# Patient Record
Sex: FEMALE | URBAN - METROPOLITAN AREA
[De-identification: names, ages, dates, MRNs, and addresses within clinical notes are randomized per-mention and may not be internally consistent; named-entity substitution may affect disease eponyms.]

---

## 2017-06-13 ENCOUNTER — APPOINTMENT (RX ONLY)
Dept: URBAN - METROPOLITAN AREA CLINIC 158 | Facility: CLINIC | Age: 22
Setting detail: DERMATOLOGY
End: 2017-06-13

## 2017-06-13 DIAGNOSIS — L50.1 IDIOPATHIC URTICARIA: ICD-10-CM

## 2017-06-13 DIAGNOSIS — L50.3 DERMATOGRAPHIC URTICARIA: ICD-10-CM

## 2017-06-13 PROBLEM — F41.9 ANXIETY DISORDER, UNSPECIFIED: Status: ACTIVE | Noted: 2017-06-13

## 2017-06-13 PROBLEM — L29.8 OTHER PRURITUS: Status: ACTIVE | Noted: 2017-06-13

## 2017-06-13 PROCEDURE — 99203 OFFICE O/P NEW LOW 30 MIN: CPT

## 2017-06-13 PROCEDURE — ? TREATMENT REGIMEN

## 2017-06-13 PROCEDURE — ? COUNSELING

## 2017-06-13 PROCEDURE — ? PRESCRIPTION

## 2017-06-13 RX ORDER — LEVOCETIRIZINE DIHYDROCHLORIDE 5 MG
ONE TABLET ORAL QAM
Qty: 30 | Refills: 5 | Status: ERX | COMMUNITY
Start: 2017-06-13

## 2017-06-13 RX ORDER — FEXOFENADINE 180 MG/1
ONE TABLET, FILM COATED ORAL
Qty: 30 | Refills: 5 | Status: ERX | COMMUNITY
Start: 2017-06-13

## 2017-06-13 RX ORDER — DOXEPIN HYDROCHLORIDE 25 MG/1
ONE CAPSULE ORAL QHS
Qty: 30 | Refills: 5 | Status: ERX | COMMUNITY
Start: 2017-06-13

## 2017-06-13 RX ADMIN — DOXEPIN HYDROCHLORIDE ONE: 25 CAPSULE ORAL at 20:31

## 2017-06-13 RX ADMIN — FEXOFENADINE ONE: 180 TABLET, FILM COATED ORAL at 20:31

## 2017-06-13 RX ADMIN — Medication ONE: at 20:31

## 2017-06-13 ASSESSMENT — LOCATION SIMPLE DESCRIPTION DERM
LOCATION SIMPLE: LEFT FOREARM
LOCATION SIMPLE: RIGHT PRETIBIAL REGION
LOCATION SIMPLE: UPPER BACK
LOCATION SIMPLE: LEFT THIGH
LOCATION SIMPLE: RIGHT UPPER ARM
LOCATION SIMPLE: ABDOMEN
LOCATION SIMPLE: RIGHT HAND

## 2017-06-13 ASSESSMENT — LOCATION DETAILED DESCRIPTION DERM
LOCATION DETAILED: LEFT ANTERIOR DISTAL THIGH
LOCATION DETAILED: RIGHT RADIAL DORSAL HAND
LOCATION DETAILED: RIGHT PROXIMAL PRETIBIAL REGION
LOCATION DETAILED: RIGHT ULNAR DORSAL HAND
LOCATION DETAILED: INFERIOR THORACIC SPINE
LOCATION DETAILED: RIGHT ANTERIOR DISTAL UPPER ARM
LOCATION DETAILED: LEFT VENTRAL PROXIMAL FOREARM
LOCATION DETAILED: PERIUMBILICAL SKIN
LOCATION DETAILED: LEFT VENTRAL DISTAL FOREARM
LOCATION DETAILED: RIGHT ANTERIOR PROXIMAL UPPER ARM

## 2017-06-13 ASSESSMENT — LOCATION ZONE DERM
LOCATION ZONE: ARM
LOCATION ZONE: LEG
LOCATION ZONE: TRUNK
LOCATION ZONE: HAND

## 2017-06-13 NOTE — HPI: RASH
Is This A New Presentation, Or A Follow-Up?: Rash
How Severe Is Your Rash?: moderate
Additional History: Pt presents with rash all over body. Was seen at urgent care on 6/8/17, given rx of hydroxyzine 10mg and had reactions to the medication.

## 2017-06-13 NOTE — PROCEDURE: TREATMENT REGIMEN
Initiate Treatment: xyzal in the morning, fexofenadine in the morning, doxipen 25mg at bedtime
Discontinue Regimen: hydroxyzine
Detail Level: Zone